# Patient Record
Sex: FEMALE | NOT HISPANIC OR LATINO | URBAN - METROPOLITAN AREA
[De-identification: names, ages, dates, MRNs, and addresses within clinical notes are randomized per-mention and may not be internally consistent; named-entity substitution may affect disease eponyms.]

---

## 2020-09-27 ENCOUNTER — WEB ENCOUNTER (OUTPATIENT)
Dept: URBAN - METROPOLITAN AREA CLINIC 35 | Facility: CLINIC | Age: 65
End: 2020-09-27

## 2020-10-01 ENCOUNTER — OFFICE VISIT (OUTPATIENT)
Dept: URBAN - METROPOLITAN AREA CLINIC 31 | Facility: CLINIC | Age: 65
End: 2020-10-01

## 2020-10-01 VITALS
BODY MASS INDEX: 22.66 KG/M2 | SYSTOLIC BLOOD PRESSURE: 135 MMHG | HEIGHT: 66 IN | WEIGHT: 141 LBS | DIASTOLIC BLOOD PRESSURE: 78 MMHG

## 2020-10-01 RX ORDER — PANTOPRAZOLE SODIUM 20 MG/1
1 TABLET TABLET, DELAYED RELEASE ORAL ONCE A DAY
Qty: 30 | Status: ACTIVE | COMMUNITY

## 2020-10-01 RX ORDER — SODIUM, POTASSIUM,MAG SULFATES 17.5-3.13G
ML AS DIRECTED SOLUTION, RECONSTITUTED, ORAL ORAL
Qty: 1 KIT | Refills: 0 | OUTPATIENT
Start: 2020-10-01

## 2020-10-01 NOTE — HPI-MIGRATED HPI
;   ;   ;     Nausea : Patient admits recent onset of nausea.  The onset was 6 months ago. Patient admits  to associated symptoms such as vomiting, abdominal pain, bloating, and gas. Patient denies dizziness, faintness, dry mouth, diarrhea, fever, and decreased urination.    Patient notes  aggravating factors such as intake of certain foods including Keto related foods, spicy food, and dark chocolate. Patient admits alleviating factors such as eating estevan slices and resting. Patient tried Pepto bismol with no relief and tried ondensetron and Tums with little relief of symptoms. ;   Acid Reflux : Patient admits the continuous symptoms of longstanding history GERD/acid reflux. The symptoms are described as burning sensation and dull pain. The pain is located epigastric region and in her chest.  Patient states that she has tried pantoprazole medication with relief of symptoms. Patient denies dysphagia, globus, sour eructations, indigestion, early satiety, changes in appetite, and coughing.   Patient admits to bloating/gas and epigastric pain, or changes in bowel habits.   Patient admits 1 formed bowel movements per day with no blood, melena, or mucus in stools. Patient states that they have had any recent changes in their medications.  Patient denies hx of gastric/esophageal cancer or diseases.  Patient denies past EGD. Patient admits past colonoscopy which was performed in [facility] in 2012.  ;   Epigastric Pain : Patient is having pain in the epigastric area.  Patient denies any specific food allergies.;

## 2020-10-28 ENCOUNTER — OFFICE VISIT (OUTPATIENT)
Dept: URBAN - METROPOLITAN AREA CLINIC 35 | Facility: CLINIC | Age: 65
End: 2020-10-28

## 2020-11-03 ENCOUNTER — OFFICE VISIT (OUTPATIENT)
Dept: URBAN - METROPOLITAN AREA SURGERY CENTER 8 | Facility: SURGERY CENTER | Age: 65
End: 2020-11-03

## 2020-11-04 ENCOUNTER — TELEPHONE ENCOUNTER (OUTPATIENT)
Dept: URBAN - METROPOLITAN AREA CLINIC 35 | Facility: CLINIC | Age: 65
End: 2020-11-04

## 2020-11-04 RX ORDER — ONDANSETRON 4 MG/1
1 TABLET ON THE TONGUE AND ALLOW TO DISSOLVE TABLET, ORALLY DISINTEGRATING ORAL EVERY 8 HOURS PRN
Qty: 90 | Refills: 3 | OUTPATIENT
Start: 2020-11-04

## 2020-11-18 ENCOUNTER — OFFICE VISIT (OUTPATIENT)
Dept: URBAN - METROPOLITAN AREA CLINIC 31 | Facility: CLINIC | Age: 65
End: 2020-11-18

## 2020-11-23 ENCOUNTER — OFFICE VISIT (OUTPATIENT)
Dept: URBAN - METROPOLITAN AREA CLINIC 33 | Facility: CLINIC | Age: 65
End: 2020-11-23

## 2020-11-23 VITALS
HEIGHT: 66 IN | DIASTOLIC BLOOD PRESSURE: 75 MMHG | BODY MASS INDEX: 23.14 KG/M2 | SYSTOLIC BLOOD PRESSURE: 122 MMHG | WEIGHT: 144 LBS

## 2020-11-23 PROBLEM — 235595009 GASTROESOPHAGEAL REFLUX DISEASE: Status: ACTIVE | Noted: 2020-11-23

## 2020-11-23 RX ORDER — PANTOPRAZOLE SODIUM 20 MG/1
1 TABLET TABLET, DELAYED RELEASE ORAL ONCE A DAY
Qty: 30 | Status: ACTIVE | COMMUNITY

## 2020-11-23 RX ORDER — ONDANSETRON 4 MG/1
1 TABLET ON THE TONGUE AND ALLOW TO DISSOLVE TABLET, ORALLY DISINTEGRATING ORAL EVERY 8 HOURS PRN
Qty: 90 | Refills: 3 | Status: ACTIVE | COMMUNITY
Start: 2020-11-04

## 2020-11-23 RX ORDER — SODIUM, POTASSIUM,MAG SULFATES 17.5-3.13G
ML AS DIRECTED SOLUTION, RECONSTITUTED, ORAL ORAL
Qty: 1 KIT | Refills: 0 | Status: ON HOLD | COMMUNITY
Start: 2020-10-01

## 2020-11-23 NOTE — HPI-MIGRATED HPI
;   ;   ;   ;   ;     Follow up- EGD : Patient presents today for follow up to her EGD which was completed on (11/3/2020) by Dr. Charles Espinoza.  Patient denies any complications after her procedure. Since the procedure patient denies dysphagia, heartburn, globus, changes in appetite, and changes in bowel habits.   EGD report shows: 1. Inflamed mucosa in the antrum and gastric body.  2. Erythematous mucosa in the esophagus.    ;   Colonoscopy Follow-Up : Patient presents today for follow up to her colonoscopy which was completed on (11/3/2020) by Dr. Charles Espinoza.  Patient enies any complications after his/her procedure. Patient currently admits 1 formed bowel movements every other day.  Patient denies any melena, blood or mucus in stools. Patient denies any associated abdominal pain, bloating, or gas.   Colonoscopy report shows: Diverticulosis in the sigmoid colon. No specimens collected.      ;   Nausea : Patient admits to occasional nausea. Patient admits that taking Ondansetron alleviates but does not completely resolve.   Last visit (10/01/2020)    Patient admits recent onset of nausea.  The onset was 6 months ago. Patient admits  to associated symptoms such as vomiting, abdominal pain, bloating, and gas. Patient denies dizziness, faintness, dry mouth, diarrhea, fever, and decreased urination.    Patient notes  aggravating factors such as intake of certain foods including Keto related foods, spicy food, and dark chocolate. Patient admits alleviating factors such as eating estevan slices and resting. Patient tried Pepto bismol with no relief and tried ondensetron and Tums with little relief of symptoms. ;   Acid Reflux : Patient admits the continuous symptoms of /longstanding history GERD/acid reflux. The symptoms are described as burning sensation and dull pain. The pain is located epigastric region and worsens when she has Keto related foods, spicy food, and dark chocolate.     Last visit (10/01/2020)    Patient admits the continuous symptoms of /longstanding history GERD/acid reflux. The symptoms are described as burning sensation and dull pain. The pain is located epigastric region and in her chest.  Patient states that she has tried pantoprazole medication with relief of symptoms. Patient denies dysphagia, globus, sour eructations, indigestion, early satiety, changes in appetite, and coughing.   Patient admits to bloating/gas and epigastric pain, or changes in bowel habits.   Patient admits 1 formed bowel movements per day with no blood, melena, or mucus in stools. Patient states that they have had any recent changes in their medications.  Patient denies hx of gastric/esophageal cancer or diseases.  Patient denies past EGD. Patient admits past colonoscopy which was performed in [facility] in 2012.  ;   Epigastric Pain : Patient continuously admits the pain is located epigastric region and worsens when she has Keto related foods, spicy food, and dark chocolate. Patient admits 1 formed bowel movements per day with no blood, melena, or mucus in stools. Patient states that they have had any recent changes in their medications.     Last visit (10/01/2020)    Patient is having pain in the epigastric area.  Patient denies any specific food allergies.;

## 2020-12-16 ENCOUNTER — TELEPHONE ENCOUNTER (OUTPATIENT)
Dept: URBAN - METROPOLITAN AREA CLINIC 35 | Facility: CLINIC | Age: 65
End: 2020-12-16

## 2021-01-07 ENCOUNTER — OFFICE VISIT (OUTPATIENT)
Dept: URBAN - METROPOLITAN AREA CLINIC 35 | Facility: CLINIC | Age: 66
End: 2021-01-07

## 2021-01-07 VITALS
BODY MASS INDEX: 22.98 KG/M2 | HEIGHT: 66 IN | OXYGEN SATURATION: 97 % | WEIGHT: 143 LBS | TEMPERATURE: 97.5 F | HEART RATE: 76 BPM

## 2021-01-07 RX ORDER — ONDANSETRON 4 MG/1
1 TABLET ON THE TONGUE AND ALLOW TO DISSOLVE TABLET, ORALLY DISINTEGRATING ORAL EVERY 8 HOURS PRN
Qty: 90 | Refills: 3 | Status: ACTIVE | COMMUNITY
Start: 2020-11-04

## 2021-01-07 RX ORDER — SODIUM, POTASSIUM,MAG SULFATES 17.5-3.13G
ML AS DIRECTED SOLUTION, RECONSTITUTED, ORAL ORAL
Qty: 1 KIT | Refills: 0 | Status: ON HOLD | COMMUNITY
Start: 2020-10-01

## 2021-01-07 RX ORDER — FAMOTIDINE 40 MG/1
1 TABLET AT BEDTIME TABLET, FILM COATED ORAL ONCE A DAY
Qty: 30 | Status: ACTIVE | COMMUNITY

## 2021-01-07 RX ORDER — PANTOPRAZOLE SODIUM 20 MG/1
1 TABLET TABLET, DELAYED RELEASE ORAL ONCE A DAY
Qty: 30 | Status: ACTIVE | COMMUNITY

## 2021-01-07 RX ORDER — OMEPRAZOLE 40 MG/1
1 CAPSULE CAPSULE, DELAYED RELEASE ORAL
Qty: 30 | Refills: 6 | OUTPATIENT
Start: 2021-01-07

## 2021-01-07 RX ORDER — SUCRALFATE 1 G
1 TABLET ON AN EMPTY STOMACH TABLET ORAL TID
Qty: 90 TABLET | Refills: 6 | OUTPATIENT
Start: 2021-01-07

## 2021-01-07 NOTE — HPI-MIGRATED HPI
;   ;   ;   ;   ;   ;     Follow up Diagnostics : Patient presents today to follow up for her US abd and HIDA Scan results.   US abd shows normal RUQ abdominal US exam with incidental left hepatic parenchymal cyst.   HIDA Scan shows Gallbladder ejection fraction of 84 %, which is considered to within the normal range.   ;   Follow up- EGD :     Last visit (11/23/2020) Patient presents today for follow up to her EGD which was completed on (11/3/2020) by Dr. Charles Espinoza.  Patient denies any complications after her procedure. Since the procedure patient denies dysphagia, heartburn, globus, changes in appetite, and changes in bowel habits.   EGD report shows: 1. Inflamed mucosa in the antrum and gastric body.  2. Erythematous mucosa in the esophagus.    ;   Colonoscopy Follow-Up :     Last visit (11/23/2020) Patient presents today for follow up to her colonoscopy which was completed on (11/3/2020) by Dr. Charles Espinoza.  Patient enies any complications after his/her procedure. Patient currently admits 1 formed bowel movements every other day.  Patient denies any melena, blood or mucus in stools. Patient denies any associated abdominal pain, bloating, or gas.   Colonoscopy report shows: Diverticulosis in the sigmoid colon. No specimens collected.      ;   Nausea : Admits to episodes of nausea at least every other day. Admits to trying Peptol Bismol and Tums with little relief. Admits ondansetron will help, when she gets some to take.    Last visit (11/23/2020) Patient admits to occasional nausea. Patient admits that taking Ondansetron alleviates but does not completely resolve.   Last visit (10/01/2020)    Patient admits recent onset of nausea.  The onset was 6 months ago. Patient admits  to associated symptoms such as vomiting, abdominal pain, bloating, and gas. Patient denies dizziness, faintness, dry mouth, diarrhea, fever, and decreased urination.    Patient notes  aggravating factors such as intake of certain foods including Keto related foods, spicy food, and dark chocolate. Patient admits alleviating factors such as eating estevan slices and resting. Patient tried Pepto Bismol with no relief and tried ondansetron and Tums with little relief of symptoms. ;   Acid Reflux : Admits to frequent episodes of heartburn, bloating/gas, and nausea. Admits she just started taking famotidine 40 mg with no difference in symptoms. She has noticed that spicy food and sometimes dairy will worsen her symptoms.     Last visit (11/23/2020) Patient admits the continuous symptoms of /longstanding history GERD/acid reflux. The symptoms are described as burning sensation and dull pain. The pain is located epigastric region and worsens when she has Keto related foods, spicy food, and dark chocolate.     Last visit (10/01/2020)    Patient admits the continuous symptoms of /longstanding history GERD/acid reflux. The symptoms are described as burning sensation and dull pain. The pain is located epigastric region and in her chest.  Patient states that she has tried Pantoprazole medication with relief of symptoms. Patient denies dysphagia, globus, sour eructations, indigestion, early satiety, changes in appetite, and coughing.   Patient admits to bloating/gas and epigastric pain, or changes in bowel habits.   Patient admits 1 formed bowel movements per day with no blood, melena, or mucus in stools. Patient states that they have had any recent changes in their medications.  Patient denies hx of gastric/esophageal cancer or diseases.  Patient denies past EGD. Patient admits past colonoscopy which was performed in [facility] in 2012.  ;   Epigastric Pain : Admits to a varying sharp/dull pain in her entire abdomen that seems to come and go after she ingest her food.      Last visit (11/23/2020)  Patient continuously admits the pain is located epigastric region and worsens when she has Keto related foods, spicy food, and dark chocolate. Patient admits 1 formed bowel movements per day with no blood, melena, or mucus in stools. Patient states that they have had any recent changes in their medications.     Last visit (10/01/2020)    Patient is having pain in the epigastric area.  Patient denies any specific food allergies.;

## 2021-01-11 ENCOUNTER — TELEPHONE ENCOUNTER (OUTPATIENT)
Dept: URBAN - METROPOLITAN AREA CLINIC 35 | Facility: CLINIC | Age: 66
End: 2021-01-11

## 2021-01-26 ENCOUNTER — TELEPHONE ENCOUNTER (OUTPATIENT)
Dept: URBAN - METROPOLITAN AREA CLINIC 35 | Facility: CLINIC | Age: 66
End: 2021-01-26

## 2021-02-03 ENCOUNTER — OFFICE VISIT (OUTPATIENT)
Dept: URBAN - METROPOLITAN AREA CLINIC 31 | Facility: CLINIC | Age: 66
End: 2021-02-03

## 2021-02-03 VITALS
DIASTOLIC BLOOD PRESSURE: 98 MMHG | HEIGHT: 66 IN | HEART RATE: 75 BPM | OXYGEN SATURATION: 97 % | WEIGHT: 147 LBS | BODY MASS INDEX: 23.63 KG/M2 | SYSTOLIC BLOOD PRESSURE: 140 MMHG

## 2021-02-03 PROBLEM — 10743008 IRRITABLE BOWEL SYNDROME: Status: ACTIVE | Noted: 2021-02-03

## 2021-02-03 RX ORDER — PANTOPRAZOLE SODIUM 20 MG/1
1 TABLET TABLET, DELAYED RELEASE ORAL ONCE A DAY
Qty: 30 | Status: ON HOLD | COMMUNITY

## 2021-02-03 RX ORDER — OMEPRAZOLE 40 MG/1
1 CAPSULE CAPSULE, DELAYED RELEASE ORAL
Qty: 30 | Refills: 6 | Status: ACTIVE | COMMUNITY
Start: 2021-01-07

## 2021-02-03 RX ORDER — FAMOTIDINE 40 MG/1
1 TABLET AT BEDTIME TABLET, FILM COATED ORAL ONCE A DAY
Qty: 30 | Status: ACTIVE | COMMUNITY

## 2021-02-03 RX ORDER — ONDANSETRON 4 MG/1
1 TABLET ON THE TONGUE AND ALLOW TO DISSOLVE TABLET, ORALLY DISINTEGRATING ORAL EVERY 8 HOURS PRN
Qty: 90 | Refills: 3 | Status: ON HOLD | COMMUNITY
Start: 2020-11-04

## 2021-02-03 RX ORDER — SODIUM, POTASSIUM,MAG SULFATES 17.5-3.13G
ML AS DIRECTED SOLUTION, RECONSTITUTED, ORAL ORAL
Qty: 1 KIT | Refills: 0 | Status: ON HOLD | COMMUNITY
Start: 2020-10-01

## 2021-02-03 RX ORDER — HYOSCYAMINE SULFATE 0.12 MG/1
1 TABLET UNDER THE TONGUE AND ALLOW TO DISSOLVE  AS NEEDED TABLET, ORALLY DISINTEGRATING ORAL THREE TIMES A DAY
Qty: 90 | Refills: 4 | OUTPATIENT
Start: 2021-02-03

## 2021-02-03 RX ORDER — SUCRALFATE 1 G
1 TABLET ON AN EMPTY STOMACH TABLET ORAL TID
Qty: 90 TABLET | Refills: 6 | Status: ACTIVE | COMMUNITY
Start: 2021-01-07

## 2021-02-03 NOTE — HPI-MIGRATED HPI
;   ;   ;   ;   ;   ;     Follow up Diagnostics :      Last visit (01/07/2021) Patient presents today to follow up for her US abd and HIDA Scan results.   US abd shows normal RUQ abdominal US exam with incidental left hepatic parenchymal cyst.   HIDA Scan shows Gallbladder ejection fraction of 84 %, which is considered to within the normal range.   ;   Follow up- EGD :     Last visit (11/23/2020) Patient presents today for follow up to her EGD which was completed on (11/3/2020) by Dr. Charles Espinoza.  Patient denies any complications after her procedure. Since the procedure patient denies dysphagia, heartburn, globus, changes in appetite, and changes in bowel habits.   EGD report shows: 1. Inflamed mucosa in the antrum and gastric body.  2. Erythematous mucosa in the esophagus.    ;   Colonoscopy Follow-Up :     Last visit (11/23/2020) Patient presents today for follow up to her colonoscopy which was completed on (11/3/2020) by Dr. Charles Espinoza.  Patient enies any complications after his/her procedure. Patient currently admits 1 formed bowel movements every other day.  Patient denies any melena, blood or mucus in stools. Patient denies any associated abdominal pain, bloating, or gas.   Colonoscopy report shows: Diverticulosis in the sigmoid colon. No specimens collected.      ;   Nausea : Patient admits continuous episodes of nausea. Patient admits that the symptoms occur postprandial.  Patient notes that she had stopped taking Zofran.     Last visit (01/07/2021) Admits to episodes of nausea at least every other day. Admits to trying Peptol Bismol and Tums with little relief. Admits ondansetron will help, when she gets some to take.    Last visit (11/23/2020) Patient admits to occasional nausea. Patient admits that taking Ondansetron alleviates but does not completely resolve.   Last visit (10/01/2020)    Patient admits recent onset of nausea.  The onset was 6 months ago. Patient admits  to associated symptoms such as vomiting, abdominal pain, bloating, and gas. Patient denies dizziness, faintness, dry mouth, diarrhea, fever, and decreased urination.    Patient notes  aggravating factors such as intake of certain foods including Keto related foods, spicy food, and dark chocolate. Patient admits alleviating factors such as eating estevan slices and resting. Patient tried Pepto Bismol with no relief and tried ondansetron and Tums with little relief of symptoms. ;   Acid Reflux : Patient admits to frequent episodes of heartburn, bloating/gas, and nausea. Patient admits that the symptoms occur postprandial. Patient admits that she stopped taking Omeprazole 40 mg and Carafate 1 GM due to allergic reactions.        Last visit (01/07/2021) Admits to frequent episodes of heartburn, bloating/gas, and nausea. Admits she just started taking famotidine 40 mg with no difference in symptoms. She has noticed that spicy food and sometimes dairy will worsen her symptoms.     Last visit (11/23/2020) Patient admits the continuous symptoms of /longstanding history GERD/acid reflux. The symptoms are described as burning sensation and dull pain. The pain is located epigastric region and worsens when she has Keto related foods, spicy food, and dark chocolate.     Last visit (10/01/2020)    Patient admits the continuous symptoms of /longstanding history GERD/acid reflux. The symptoms are described as burning sensation and dull pain. The pain is located epigastric region and in her chest.  Patient states that she has tried Pantoprazole medication with relief of symptoms. Patient denies dysphagia, globus, sour eructations, indigestion, early satiety, changes in appetite, and coughing.   Patient admits to bloating/gas and epigastric pain, or changes in bowel habits.   Patient admits 1 formed bowel movements per day with no blood, melena, or mucus in stools. Patient states that they have had any recent changes in their medications.  Patient denies hx of gastric/esophageal cancer or diseases.  Patient denies past EGD. Patient admits past colonoscopy which was performed in [facility] in 2012.  ;   Epigastric Pain : Patient admits epigastric pain which radiates towards her lower abdomen. Patient admits intermittent diarrhea along with her sypmtoms.      Last visit (01/07/2021) Admits to a varying sharp/dull pain in her entire abdomen that seems to come and go after she ingest her food.      Last visit (11/23/2020)  Patient continuously admits the pain is located epigastric region and worsens when she has Keto related foods, spicy food, and dark chocolate. Patient admits 1 formed bowel movements per day with no blood, melena, or mucus in stools. Patient states that they have had any recent changes in their medications.     Last visit (10/01/2020)    Patient is having pain in the epigastric area.  Patient denies any specific food allergies.;

## 2021-11-18 ENCOUNTER — TELEPHONE ENCOUNTER (OUTPATIENT)
Dept: URBAN - METROPOLITAN AREA CLINIC 35 | Facility: CLINIC | Age: 66
End: 2021-11-18

## 2021-11-18 RX ORDER — ONDANSETRON 4 MG/1
1 TABLET ON THE TONGUE AND ALLOW TO DISSOLVE TABLET, ORALLY DISINTEGRATING ORAL EVERY 8 HOURS PRN
Qty: 90 | Refills: 3 | OUTPATIENT
Start: 2020-11-04

## 2022-02-02 ENCOUNTER — TELEPHONE ENCOUNTER (OUTPATIENT)
Dept: URBAN - METROPOLITAN AREA CLINIC 36 | Facility: CLINIC | Age: 67
End: 2022-02-02

## 2022-02-02 RX ORDER — HYOSCYAMINE SULFATE 0.12 MG/1
1 TABLET UNDER THE TONGUE AND ALLOW TO DISSOLVE  AS NEEDED TABLET, ORALLY DISINTEGRATING ORAL THREE TIMES A DAY
Qty: 90 | Refills: 0
Start: 2021-02-03 | End: 2022-03-04

## 2022-02-09 ENCOUNTER — DASHBOARD ENCOUNTERS (OUTPATIENT)
Age: 67
End: 2022-02-09

## 2022-02-09 ENCOUNTER — OFFICE VISIT (OUTPATIENT)
Dept: URBAN - METROPOLITAN AREA CLINIC 31 | Facility: CLINIC | Age: 67
End: 2022-02-09
Payer: COMMERCIAL

## 2022-02-09 VITALS
HEIGHT: 66 IN | WEIGHT: 133 LBS | OXYGEN SATURATION: 98 % | HEART RATE: 115 BPM | SYSTOLIC BLOOD PRESSURE: 130 MMHG | BODY MASS INDEX: 21.38 KG/M2 | DIASTOLIC BLOOD PRESSURE: 78 MMHG

## 2022-02-09 DIAGNOSIS — R10.13 EPIGASTRIC ABDOMINAL PAIN: ICD-10-CM

## 2022-02-09 DIAGNOSIS — K21.00 GASTROESOPHAGEAL REFLUX DISEASE WITH ESOPHAGITIS WITHOUT HEMORRHAGE: ICD-10-CM

## 2022-02-09 DIAGNOSIS — R11.0 NAUSEA: ICD-10-CM

## 2022-02-09 PROBLEM — 266433003: Status: ACTIVE | Noted: 2022-02-09

## 2022-02-09 PROCEDURE — 99213 OFFICE O/P EST LOW 20 MIN: CPT | Performed by: INTERNAL MEDICINE

## 2022-02-09 RX ORDER — HYOSCYAMINE SULFATE 0.12 MG/1
1 TABLET UNDER THE TONGUE AND ALLOW TO DISSOLVE  AS NEEDED TABLET, ORALLY DISINTEGRATING ORAL THREE TIMES A DAY
Qty: 90 | Refills: 11 | OUTPATIENT
Start: 2022-02-09 | End: 2023-02-04

## 2022-02-09 RX ORDER — SODIUM, POTASSIUM,MAG SULFATES 17.5-3.13G
ML AS DIRECTED SOLUTION, RECONSTITUTED, ORAL ORAL
Qty: 1 KIT | Refills: 0 | COMMUNITY
Start: 2020-10-01

## 2022-02-09 RX ORDER — OMEPRAZOLE 40 MG/1
1 CAPSULE CAPSULE, DELAYED RELEASE ORAL
Qty: 30 | Refills: 6 | Status: ON HOLD | COMMUNITY
Start: 2021-01-07

## 2022-02-09 RX ORDER — FAMOTIDINE 40 MG/1
1 TABLET AT BEDTIME TABLET, FILM COATED ORAL ONCE A DAY
Qty: 30 | Status: ACTIVE | COMMUNITY

## 2022-02-09 RX ORDER — ONDANSETRON 4 MG/1
1 TABLET ON THE TONGUE AND ALLOW TO DISSOLVE TABLET, ORALLY DISINTEGRATING ORAL
Qty: 90 | Refills: 11 | OUTPATIENT
Start: 2022-02-09

## 2022-02-09 RX ORDER — LISINOPRIL 20 MG/1
1 TABLET TABLET ORAL ONCE A DAY
Refills: 0 | Status: ACTIVE | COMMUNITY

## 2022-02-09 RX ORDER — ONDANSETRON 4 MG/1
1 TABLET ON THE TONGUE AND ALLOW TO DISSOLVE TABLET, ORALLY DISINTEGRATING ORAL EVERY 8 HOURS PRN
Qty: 90 | Refills: 3 | Status: ACTIVE | COMMUNITY
Start: 2020-11-04

## 2022-02-09 RX ORDER — SUCRALFATE 1 G
1 TABLET ON AN EMPTY STOMACH TABLET ORAL TID
Qty: 90 TABLET | Refills: 6 | Status: ON HOLD | COMMUNITY
Start: 2021-01-07

## 2022-02-09 RX ORDER — HYOSCYAMINE SULFATE 0.12 MG/1
1 TABLET UNDER THE TONGUE AND ALLOW TO DISSOLVE  AS NEEDED TABLET, ORALLY DISINTEGRATING ORAL THREE TIMES A DAY
Qty: 90 | Refills: 0 | Status: ACTIVE | COMMUNITY
Start: 2021-02-03 | End: 2022-03-04

## 2022-02-09 RX ORDER — PANTOPRAZOLE SODIUM 20 MG/1
1 TABLET TABLET, DELAYED RELEASE ORAL ONCE A DAY
Qty: 30 | Status: ON HOLD | COMMUNITY

## 2022-02-09 NOTE — HPI-TODAY'S VISIT:
67 y/o female patient presents today for 1 year follow up of nausea and acid reflux. Patient currently admits continued symptoms of intermittent nausea. Patient admits to associated symptoms, such as vomiting, abdominal pain, bloating, and gas. Patient denies dizziness, faintness, dry mouth, diarrhea, fever, and decreased urination.    Patient notes  aggravating factors as intake of certain foods, including Keto related foods, spicy food, and dark chocolate.    Patient admits taking Levsin, Pantoprazole 40 mg, and Zofran 4 mg as needed for relief of symptoms.  Patient admits 1 bowel movement every other day with no mucus, melena, or blood in stools.      Last visit (02/03/2021) Patient presents today to follow up for her US abd and HIDA Scan results.   US abd shows normal RUQ abdominal US exam with incidental left hepatic parenchymal cyst.   HIDA Scan shows Gallbladder ejection fraction of 84 %, which is considered to within the normal range.      Follow up- EGD :   Last visit (11/23/2020) Patient presents today for follow up to her EGD which was completed on (11/3/2020) by Dr. Charles Espinoza.  Patient denies any complications after her procedure. Since the procedure patient denies dysphagia, heartburn, globus, changes in appetite, and changes in bowel habits.   EGD report shows: 1. Inflamed mucosa in the antrum and gastric body.  2. Erythematous mucosa in the esophagus.    Colonoscopy Follow-Up :     Last visit (11/23/2020) Patient presents today for follow up to her colonoscopy which was completed on (11/3/2020) by Dr. Charles Espinoza.  Patient enies any complications after his/her procedure. Patient currently admits 1 formed bowel movements every other day.  Patient denies any melena, blood or mucus in stools. Patient denies any associated abdominal pain, bloating, or gas.   Colonoscopy report shows: Diverticulosis in the sigmoid colon. No specimens collected.         Acid Reflux : Patient admits to frequent episodes of heartburn, bloating/gas, and nausea. Patient admits that the symptoms occur postprandial. Patient admits that she stopped taking Omeprazole 40 mg and Carafate 1 GM due to allergic reactions.        Last visit (01/07/2021)  Admits to frequent episodes of heartburn, bloating/gas, and nausea. Admits she just started taking famotidine 40 mg with no difference in symptoms. She has noticed that spicy food and sometimes dairy will worsen her symptoms.     Last visit (11/23/2020)  Patient admits the continuous symptoms of /longstanding history GERD/acid reflux. The symptoms are described as burning sensation and dull pain. The pain is located epigastric region and worsens when she has Keto related foods, spicy food, and dark chocolate.     Last visit (10/01/2020)    Patient admits the continuous symptoms of /longstanding history GERD/acid reflux. The symptoms are described as burning sensation and dull pain. The pain is located epigastric region and in her chest.  Patient states that she has tried Pantoprazole medication with relief of symptoms. Patient denies dysphagia, globus, sour eructations, indigestion, early satiety, changes in appetite, and coughing.   Patient admits to bloating/gas and epigastric pain, or changes in bowel habits.   Patient admits 1 formed bowel movements per day with no blood, melena, or mucus in stools. Patient states that they have had any recent changes in their medications.  Patient denies hx of gastric/esophageal cancer or diseases.  Patient denies past EGD. Patient admits past colonoscopy which was performed in [facility] in 2012. 0

## 2022-02-09 NOTE — HPI-NAUSEA
Patient admits intermittent nausea and vomiting.  Patient admits taking Levsin, Pantoprazole 40 mg, and Zofran 4 mg as needed for relief of symptoms.  Patient admits 1 bowel movement every other day with no mucus, melena, or blood in stools.     Last visit (02/03/2021) Nausea : Patient admits continuous episodes of nausea. Patient admits that the symptoms occur postprandial.  Patient notes that she had stopped taking Zofran.     Last visit (01/07/2021) Admits to episodes of nausea at least every other day. Admits to trying Peptol Bismol and Tums with little relief. Admits ondansetron will help, when she gets some to take.    Last visit (11/23/2020) Patient admits to occasional nausea. Patient admits that taking Ondansetron alleviates but does not completely resolve.   Last visit (10/01/2020)      Patient admits recent onset of nausea.  The onset was 6 months ago. Patient admits  to associated symptoms such as vomiting, abdominal pain, bloating, and gas. Patient denies dizziness, faintness, dry mouth, diarrhea, fever, and decreased urination.    Patient notes  aggravating factors such as intake of certain foods including Keto related foods, spicy food, and dark chocolate. Patient admits alleviating factors such as eating ginger slices and resting. Patient tried Pepto Bismol with no relief and tried ondansetron and Tums with little relief of symptoms.

## 2022-02-09 NOTE — HPI-EPIGASTRIC PAIN
Patient admits intermittent epigatric pain, which radiate towards lower abdomen. Patient admits taking Levsin, Pantoprazole 40 mg, and Zofran 4 mg as needed for relief of symptoms.  Patient admits 1 bowel movement every other day with no mucus, melena, or blood in stools.     Last visit (02/03/2021) Epigastric Pain : Patient admits epigastric pain which radiates towards her lower abdomen. Patient admits intermittent diarrhea along with her sypmtoms.      Last visit (01/07/2021) Admits to a varying sharp/dull pain in her entire abdomen that seems to come and go after she ingest her food.      Last visit (11/23/2020)  Patient continuously admits the pain is located epigastric region and worsens when she has Keto related foods, spicy food, and dark chocolate.  Patient admits 1 formed bowel movements per day with no blood, melena, or mucus in stools. Patient states that they have had any recent changes in their medications.    Last visit (10/01/2020)      Patient is having pain in the epigastric area.  Patient denies any specific food allergies.